# Patient Record
Sex: FEMALE | Race: WHITE | Employment: UNEMPLOYED | RURAL
[De-identification: names, ages, dates, MRNs, and addresses within clinical notes are randomized per-mention and may not be internally consistent; named-entity substitution may affect disease eponyms.]

---

## 2017-07-10 ENCOUNTER — OFFICE VISIT (OUTPATIENT)
Dept: FAMILY MEDICINE CLINIC | Age: 22
End: 2017-07-10

## 2017-07-10 VITALS
HEIGHT: 67 IN | SYSTOLIC BLOOD PRESSURE: 117 MMHG | DIASTOLIC BLOOD PRESSURE: 73 MMHG | RESPIRATION RATE: 20 BRPM | WEIGHT: 162.8 LBS | HEART RATE: 108 BPM | TEMPERATURE: 98 F | OXYGEN SATURATION: 98 % | BODY MASS INDEX: 25.55 KG/M2

## 2017-07-10 DIAGNOSIS — R00.0 TACHYCARDIA: ICD-10-CM

## 2017-07-10 DIAGNOSIS — Z00.00 ROUTINE ADULT HEALTH MAINTENANCE: Primary | ICD-10-CM

## 2017-07-10 RX ORDER — NORGESTIMATE AND ETHINYL ESTRADIOL 7DAYSX3 28
1 KIT ORAL DAILY
Qty: 84 TAB | Refills: 3 | Status: SHIPPED | COMMUNITY
Start: 2017-07-10 | End: 2017-08-30 | Stop reason: SDUPTHER

## 2017-07-10 NOTE — LETTER
7/12/2017 12:13 PM 
 
Ms. Goldie Quiñones [address] [address] Dear Goldie Quiñones: 
 
Please find your most recent results below. Resulted Orders CBC W/O DIFF Result Value Ref Range WBC 5.4 3.4 - 10.8 x10E3/uL  
 RBC 4.34 3.77 - 5.28 x10E6/uL HGB 12.9 11.1 - 15.9 g/dL HCT 40.5 34.0 - 46.6 % MCV 93 79 - 97 fL  
 MCH 29.7 26.6 - 33.0 pg  
 MCHC 31.9 31.5 - 35.7 g/dL  
 RDW 13.3 12.3 - 15.4 % PLATELET 889 507 - 606 x10E3/uL Narrative Performed at:  [address]  [id number] : Joaquín Jones MD, Phone:  [phone] METABOLIC PANEL, COMPREHENSIVE Result Value Ref Range Glucose 100 (H) 65 - 99 mg/dL BUN 9 6 - 20 mg/dL Creatinine 0.70 0.57 - 1.00 mg/dL GFR est non- >59 mL/min/1.73 GFR est  >59 mL/min/1.73  
 BUN/Creatinine ratio 13 9 - 23 Sodium 140 134 - 144 mmol/L Potassium 4.3 3.5 - 5.2 mmol/L Chloride 98 96 - 106 mmol/L  
 CO2 28 18 - 29 mmol/L Calcium 9.2 8.7 - 10.2 mg/dL Protein, total 7.0 6.0 - 8.5 g/dL Albumin 4.1 3.5 - 5.5 g/dL GLOBULIN, TOTAL 2.9 1.5 - 4.5 g/dL A-G Ratio 1.4 1.2 - 2.2 Bilirubin, total 0.3 0.0 - 1.2 mg/dL Alk. phosphatase 50 39 - 117 IU/L  
 AST (SGOT) 14 0 - 40 IU/L  
 ALT (SGPT) 10 0 - 32 IU/L Narrative Performed at:  [address]  [id number] : Joaquín Jones MD, Phone:  [phone] TSH 3RD GENERATION Result Value Ref Range TSH 0.930 0.450 - 4.500 uIU/mL Narrative Performed at:  [address]  [id number] : Joaquín Jones MD, Phone:  [phone] T4, FREE Result Value Ref Range T4, Free 1.14 0.82 - 1.77 ng/dL Narrative Performed at:  [address]  [id number] : Joaquín Jones MD, Phone:  [phone] RECOMMENDATIONS: 
Labs look fine.  Watch sugar in diet because of high normal blood sugar.  Normal thyroid.  Monitor you heart rate. Please call me if you have any questions: [phone] Sincerely, Cherie Bee MD

## 2017-07-10 NOTE — PROGRESS NOTES
Subjective:   25 y.o. female for Well Woman Check. Her gyne and breast care is done elsewhere by her Ob-Gyne physician. Patient Active Problem List    Diagnosis Date Noted    IBS (irritable bowel syndrome) 02/10/2012     Current Outpatient Prescriptions   Medication Sig Dispense Refill    norgestimate-ethinyl estradiol (TRINESSA, 28,) 0.18/0.215/0.25 mg-35 mcg (28) tab Take 1 Tab by mouth daily. 84 Tab 3     Allergies   Allergen Reactions    Pcn [Penicillins] Rash    Erythromycin Nausea and Vomiting     Past Medical History:   Diagnosis Date    IBS (irritable bowel syndrome) 2/10/2012    Strep throat      Past Surgical History:   Procedure Laterality Date    HX COLONOSCOPY      HX ENDOSCOPY      HX HEENT  2015    tonsillectomy    HX WISDOM TEETH EXTRACTION       History reviewed. No pertinent family history. Social History   Substance Use Topics    Smoking status: Never Smoker    Smokeless tobacco: Never Used    Alcohol use 3.0 oz/week     6 Standard drinks or equivalent per week      Comment: twice a month             ROS: Feeling generally well. No TIA's or unusual headaches, no dysphagia. No prolonged cough. No dyspnea or chest pain on exertion. No abdominal pain, change in bowel habits, black or bloody stools. No urinary tract symptoms. No new or unusual musculoskeletal symptoms. Specific concerns today: med refill. She had PAP done in [place] Oct 2016. Objective: The patient appears well, alert, oriented x 3, in no distress. Visit Vitals    /73 (BP 1 Location: Right arm, BP Patient Position: Sitting)    Pulse (!) 108    Temp 98 °F (36.7 °C) (Oral)    Resp 20    Ht 5' 7\" (1.702 m)    Wt 162 lb 12.8 oz (73.8 kg)    LMP 07/09/2017    SpO2 98%    BMI 25.5 kg/m2     ENT normal.  Neck supple. No adenopathy or thyromegaly. ANTONETTE. Lungs are clear, good air entry, no wheezes, rhonchi or rales. S1 and S2 normal, no murmurs, regular rate and rhythm.  Abdomen soft without tenderness, guarding, mass or organomegaly. Extremities show no edema, normal peripheral pulses. Neurological is normal, no focal findings. Breast and Pelvic exams are deferred. Assessment/Plan:   Well Woman  follow low fat diet, follow low salt diet, routine labs ordered    ICD-10-CM ICD-9-CM    1. Routine adult health maintenance Z00.00 V70.0 norgestimate-ethinyl estradiol (TRINESSA, 28,) 0.18/0.215/0.25 mg-35 mcg (28) tab      CBC W/O DIFF      METABOLIC PANEL, COMPREHENSIVE      TSH 3RD GENERATION      T4, FREE   2.  Tachycardia R00.0 785.0

## 2017-07-10 NOTE — MR AVS SNAPSHOT
Visit Information Date & Time Provider Department Dept. Phone Encounter #  
 [id number]  0:60 PM Bethanie OliveraEnrrique [phone] [id number] Upcoming Health Maintenance Date Due INFLUENZA AGE 9 TO ADULT 8/1/2017 HPV AGE 9Y-26Y (2 of 3 - Female 3 Dose Series) 9/4/2017 PAP AKA CERVICAL CYTOLOGY 7/10/2020 DTaP/Tdap/Td series (2 - Td) 7/10/2027 Allergies as of 7/10/2017  Review Complete On: 1/39/2253 By: Bethanie Olivera MD  
  
 Severity Noted Reaction Type Reactions Pcn [Penicillins]  09/12/2011    Rash Erythromycin Low 05/20/2015    Nausea and Vomiting Current Immunizations  Never Reviewed Name Date Meningococcal (MCV4P) Vaccine 5/26/2015 Not reviewed this visit You Were Diagnosed With   
  
 Codes Comments Routine adult health maintenance    -  Primary ICD-10-CM: Z00.00 ICD-9-CM: V70.0 Tachycardia     ICD-10-CM: R00.0 ICD-9-CM: 813. 0 Vitals BP Pulse Temp Resp Height(growth percentile) Weight(growth percentile) 117/73 (BP 1 Location: Right arm, BP Patient Position: Sitting) (!) 108 98 °F (36.7 °C) (Oral) 20 5' 7\" (1.702 m) 162 lb 12.8 oz (73.8 kg) LMP SpO2 BMI OB Status Smoking Status 07/09/2017 98% 25.5 kg/m2 Having regular periods Never Smoker Vitals History BMI and BSA Data Body Mass Index Body Surface Area 25.5 kg/m 2 1.87 m 2 Preferred Pharmacy Pharmacy Name Phone [facility] [phone] Your Updated Medication List  
  
   
This list is accurate as of: 7/10/17  2:35 PM.  Always use your most recent med list.  
  
  
  
  
 norgestimate-ethinyl estradiol 0.18/0.215/0.25 mg-35 mcg (28) Tab Commonly known as:  TRINESSA (28) Take 1 Tab by mouth daily. Prescriptions Sent to Mail Order  Refills  
 norgestimate-ethinyl estradiol (TRINESSA, 28,) 0.18/0.215/0.25 mg-35 mcg (28) tab 3 Sig: Take 1 Tab by mouth daily. Class: Mail Order Pharmacy: [address], [address] [place] [address] #: [phone] Route: Oral  
  
We Performed the Following CBC W/O DIFF [10706 CPT(R)] METABOLIC PANEL, COMPREHENSIVE [29724 CPT(R)] T4, FREE [id number] CPT(R)] TSH 3RD GENERATION [67067 CPT(R)] Introducing [facility] SERVICES! Robert Conradohilario introduces [employer] patient portal. Now you can access parts of your medical record, email your doctor's office, and request medication refills online. 1. In your internet browser, go to [email or web address] 2. Click on the First Time User? Click Here link in the Sign In box. You will see the New Member Sign Up page. 3. Enter your [employer] Access Code exactly as it appears below. You will not need to use this code after youve completed the sign-up process. If you do not sign up before the expiration date, you must request a new code. · [employer] Access Code: [email or web address] Expires: 10/8/2017  2:35 PM 
 
4. Enter the last four digits of your Social Security Number (xxxx) and Date of Birth (mm/dd/yyyy) as indicated and click Submit. You will be taken to the next sign-up page. 5. Create a [employer] ID. This will be your [employer] login ID and cannot be changed, so think of one that is secure and easy to remember. 6. Create a [employer] password. You can change your password at any time. 7. Enter your Password Reset Question and Answer. This can be used at a later time if you forget your password. 8. Enter your e-mail address. You will receive e-mail notification when new information is available in [address] 9. Click Sign Up. You can now view and download portions of your medical record. 10. Click the Download Summary menu link to download a portable copy of your medical information.  
 
If you have questions, please visit the Frequently Asked Questions section of the [employer]. Remember, [employer] is NOT to be used for urgent needs. For medical emergencies, dial 911. Now available from your iPhone and Android! Please provide this summary of care documentation to your next provider. Your primary care clinician is listed as Kaiden Tabares. If you have any questions after today's visit, please call [phone].

## 2017-07-10 NOTE — PROGRESS NOTES
Identified pt with two pt identifiers(name and ). Chief Complaint   Patient presents with    Medication Refill        There are no preventive care reminders to display for this patient. Wt Readings from Last 3 Encounters:   07/10/17 162 lb 12.8 oz (73.8 kg)   05/20/15 168 lb (76.2 kg) (92 %, Z= 1.39)*   05/19/15 171 lb 1 oz (77.6 kg) (93 %, Z= 1.46)*     * Growth percentiles are based on [facility] 2-20 Years data. Temp Readings from Last 3 Encounters:   07/10/17 98 °F (36.7 °C) (Oral)   05/20/15 99.5 °F (37.5 °C) (Oral)   05/19/15 98.9 °F (37.2 °C)     BP Readings from Last 3 Encounters:   07/10/17 117/73   05/20/15 132/87   05/19/15 120/77     Pulse Readings from Last 3 Encounters:   07/10/17 (!) 108   05/20/15 100   05/19/15 92         Learning Assessment:  :     Learning Assessment 2014   PRIMARY LEARNER Patient   HIGHEST LEVEL OF EDUCATION - PRIMARY LEARNER  DID NOT GRADUATE HIGH SCHOOL   BARRIERS PRIMARY LEARNER NONE   CO-LEARNER CAREGIVER No   PRIMARY LANGUAGE ENGLISH   LEARNER PREFERENCE PRIMARY DEMONSTRATION   ANSWERED BY patient   RELATIONSHIP SELF       Depression Screening:  :     PHQ over the last two weeks 7/10/2017   Little interest or pleasure in doing things Not at all   Feeling down, depressed or hopeless Not at all   Total Score PHQ 2 0       Fall Risk Assessment:  :     No flowsheet data found. Abuse Screening:  :     Abuse Screening Questionnaire 7/10/2017 2014   Do you ever feel afraid of your partner? N N   Are you in a relationship with someone who physically or mentally threatens you? N N   Is it safe for you to go home? Y Y       Coordination of Care Questionnaire:  :     1) Have you been to an emergency room, urgent care clinic since your last visit? yes 8/15 [facility]  Hospitalized since your last visit? Yes in [place]            2) Have you seen or consulted any other health care providers outside of [address] [place] [address] since your last visit?  yes OBGYN (Include any pap smears or colon screenings in this section.)    3) Do you have an Advance Directive on file? no  Are you interested in receiving information about Advance Directives? Yes paperwork given and explained     Reviewed record in preparation for visit and have obtained necessary documentation. Medication reconciliation up to date and corrected with patient at this time. She will get records for vaccines and pap for us.

## 2017-07-11 LAB
ALBUMIN SERPL-MCNC: 4.1 G/DL (ref 3.5–5.5)
ALBUMIN/GLOB SERPL: 1.4 {RATIO} (ref 1.2–2.2)
ALP SERPL-CCNC: 50 IU/L (ref 39–117)
ALT SERPL-CCNC: 10 IU/L (ref 0–32)
AST SERPL-CCNC: 14 IU/L (ref 0–40)
BILIRUB SERPL-MCNC: 0.3 MG/DL (ref 0–1.2)
BUN SERPL-MCNC: 9 MG/DL (ref 6–20)
BUN/CREAT SERPL: 13 (ref 9–23)
CALCIUM SERPL-MCNC: 9.2 MG/DL (ref 8.7–10.2)
CHLORIDE SERPL-SCNC: 98 MMOL/L (ref 96–106)
CO2 SERPL-SCNC: 28 MMOL/L (ref 18–29)
CREAT SERPL-MCNC: 0.7 MG/DL (ref 0.57–1)
ERYTHROCYTE [DISTWIDTH] IN BLOOD BY AUTOMATED COUNT: 13.3 % (ref 12.3–15.4)
GLOBULIN SER CALC-MCNC: 2.9 G/DL (ref 1.5–4.5)
GLUCOSE SERPL-MCNC: 100 MG/DL (ref 65–99)
HCT VFR BLD AUTO: 40.5 % (ref 34–46.6)
HGB BLD-MCNC: 12.9 G/DL (ref 11.1–15.9)
MCH RBC QN AUTO: 29.7 PG (ref 26.6–33)
MCHC RBC AUTO-ENTMCNC: 31.9 G/DL (ref 31.5–35.7)
MCV RBC AUTO: 93 FL (ref 79–97)
PLATELET # BLD AUTO: 327 X10E3/UL (ref 150–379)
POTASSIUM SERPL-SCNC: 4.3 MMOL/L (ref 3.5–5.2)
PROT SERPL-MCNC: 7 G/DL (ref 6–8.5)
RBC # BLD AUTO: 4.34 X10E6/UL (ref 3.77–5.28)
SODIUM SERPL-SCNC: 140 MMOL/L (ref 134–144)
T4 FREE SERPL-MCNC: 1.14 NG/DL (ref 0.82–1.77)
TSH SERPL DL<=0.005 MIU/L-ACNC: 0.93 UIU/ML (ref 0.45–4.5)
WBC # BLD AUTO: 5.4 X10E3/UL (ref 3.4–10.8)

## 2017-07-12 NOTE — PROGRESS NOTES
Labs look fine. Watch sugar in diet because of high normal blood sugar. Normal thyroid. Monitor you heart rate.

## 2017-08-30 DIAGNOSIS — Z00.00 ROUTINE ADULT HEALTH MAINTENANCE: ICD-10-CM

## 2017-11-14 DIAGNOSIS — Z00.00 ROUTINE ADULT HEALTH MAINTENANCE: ICD-10-CM

## 2017-11-14 RX ORDER — NORGESTIMATE AND ETHINYL ESTRADIOL 7DAYSX3 28
1 KIT ORAL DAILY
Qty: 84 TAB | Refills: 0 | Status: SHIPPED | COMMUNITY
Start: 2017-11-14 | End: 2018-01-21 | Stop reason: SDUPTHER

## 2017-11-15 NOTE — TELEPHONE ENCOUNTER
Please call pt. We never received copy of her PAP from [place] in 2016. Need to track this down. I will refill BCP for only 3 months.

## 2018-01-21 DIAGNOSIS — Z00.00 ROUTINE ADULT HEALTH MAINTENANCE: ICD-10-CM

## 2018-01-22 RX ORDER — NORGESTIMATE AND ETHINYL ESTRADIOL 7DAYSX3 28
KIT ORAL
Qty: 84 TAB | Refills: 0 | Status: SHIPPED | OUTPATIENT
Start: 2018-01-22

## 2018-01-22 NOTE — TELEPHONE ENCOUNTER
Please call pt. We need copy of her last PAP done in [place] (2016) or she needs to come in to have one done.